# Patient Record
Sex: MALE | Race: WHITE | NOT HISPANIC OR LATINO | Employment: UNEMPLOYED | URBAN - NONMETROPOLITAN AREA
[De-identification: names, ages, dates, MRNs, and addresses within clinical notes are randomized per-mention and may not be internally consistent; named-entity substitution may affect disease eponyms.]

---

## 2017-11-09 ENCOUNTER — HOSPITAL ENCOUNTER (EMERGENCY)
Facility: HOSPITAL | Age: 42
Discharge: HOME | End: 2017-11-09
Attending: EMERGENCY MEDICINE | Admitting: NURSE PRACTITIONER

## 2017-11-09 VITALS
TEMPERATURE: 98 F | HEART RATE: 105 BPM | OXYGEN SATURATION: 99 % | SYSTOLIC BLOOD PRESSURE: 134 MMHG | RESPIRATION RATE: 18 BRPM | DIASTOLIC BLOOD PRESSURE: 79 MMHG | HEIGHT: 69 IN | WEIGHT: 130 LBS | BODY MASS INDEX: 19.26 KG/M2

## 2017-11-09 DIAGNOSIS — R11.0 NAUSEA: Primary | ICD-10-CM

## 2017-11-09 PROCEDURE — 99283 EMERGENCY DEPT VISIT LOW MDM: CPT

## 2017-11-09 RX ORDER — PROMETHAZINE HYDROCHLORIDE 25 MG/1
25 TABLET ORAL EVERY 6 HOURS PRN
Qty: 12 TABLET | Refills: 0 | Status: SHIPPED | OUTPATIENT
Start: 2017-11-09

## 2017-11-10 NOTE — ED PROVIDER NOTES
Subjective   Patient is a 42 y.o. male presenting with nausea.   History provided by:  Patient  Nausea   The primary symptoms include nausea and vomiting. Primary symptoms do not include fever, abdominal pain, diarrhea or dysuria. The illness began today. The onset was gradual. The problem has been gradually improving.   The illness does not include chills, anorexia, dysphagia, odynophagia, bloating, constipation, tenesmus, back pain or itching.       Review of Systems   Constitutional: Negative.  Negative for chills and fever.   HENT: Negative.    Respiratory: Negative.    Cardiovascular: Negative.  Negative for chest pain.   Gastrointestinal: Positive for nausea and vomiting. Negative for abdominal pain, anorexia, bloating, constipation, diarrhea and dysphagia.   Endocrine: Negative.    Genitourinary: Negative.  Negative for dysuria.   Musculoskeletal: Negative for back pain.   Skin: Negative.  Negative for itching.   Neurological: Negative.    Psychiatric/Behavioral: Negative.    All other systems reviewed and are negative.      History reviewed. No pertinent past medical history.    No Known Allergies    History reviewed. No pertinent surgical history.    History reviewed. No pertinent family history.    Social History     Social History   • Marital status:      Spouse name: N/A   • Number of children: N/A   • Years of education: N/A     Social History Main Topics   • Smoking status: Current Every Day Smoker     Packs/day: 1.00     Types: Cigarettes   • Smokeless tobacco: None   • Alcohol use 7.2 oz/week     12 Cans of beer per week   • Drug use: No   • Sexual activity: Not Asked     Other Topics Concern   • None     Social History Narrative   • None           Objective   Physical Exam   Constitutional: He is oriented to person, place, and time. He appears well-developed and well-nourished. No distress.   HENT:   Head: Normocephalic and atraumatic.   Right Ear: External ear normal.   Left Ear: External  ear normal.   Nose: Nose normal.   Eyes: Conjunctivae and EOM are normal. Pupils are equal, round, and reactive to light.   Neck: Normal range of motion. Neck supple. No JVD present. No tracheal deviation present.   Cardiovascular: Normal rate, regular rhythm and normal heart sounds.    No murmur heard.  Pulmonary/Chest: Effort normal and breath sounds normal. No respiratory distress. He has no wheezes.   Abdominal: Soft. Bowel sounds are normal. There is no tenderness.   Musculoskeletal: Normal range of motion. He exhibits no edema or deformity.   Neurological: He is alert and oriented to person, place, and time. No cranial nerve deficit.   Skin: Skin is warm and dry. No rash noted. He is not diaphoretic. No erythema. No pallor.   Psychiatric: He has a normal mood and affect. His behavior is normal. Thought content normal.   Nursing note and vitals reviewed.      Procedures         ED Course  ED Course                  MDM  Number of Diagnoses or Management Options  Nausea: minor  Risk of Complications, Morbidity, and/or Mortality  Presenting problems: minimal  Diagnostic procedures: minimal  Management options: minimal    Patient Progress  Patient progress: stable      Final diagnoses:   Nausea            Lucy Amato, APRN  11/09/17 0435

## 2020-03-16 ENCOUNTER — APPOINTMENT (OUTPATIENT)
Dept: GENERAL RADIOLOGY | Facility: HOSPITAL | Age: 45
End: 2020-03-16

## 2020-03-16 ENCOUNTER — HOSPITAL ENCOUNTER (EMERGENCY)
Facility: HOSPITAL | Age: 45
Discharge: ANOTHER HOSPITAL | End: 2020-03-16
Attending: EMERGENCY MEDICINE | Admitting: EMERGENCY MEDICINE

## 2020-03-16 VITALS
SYSTOLIC BLOOD PRESSURE: 86 MMHG | HEIGHT: 69 IN | HEART RATE: 121 BPM | DIASTOLIC BLOOD PRESSURE: 72 MMHG | WEIGHT: 140 LBS | BODY MASS INDEX: 20.73 KG/M2 | OXYGEN SATURATION: 100 % | TEMPERATURE: 97.8 F | RESPIRATION RATE: 26 BRPM

## 2020-03-16 DIAGNOSIS — I50.9 ACUTE ON CHRONIC CONGESTIVE HEART FAILURE, UNSPECIFIED HEART FAILURE TYPE (HCC): ICD-10-CM

## 2020-03-16 DIAGNOSIS — J44.9 END STAGE COPD (HCC): Primary | ICD-10-CM

## 2020-03-16 LAB
A-A DO2: 137.9 MMHG (ref 0–300)
ALBUMIN SERPL-MCNC: 3.4 G/DL (ref 3.5–5.2)
ALBUMIN/GLOB SERPL: 1 G/DL
ALP SERPL-CCNC: 144 U/L (ref 39–117)
ALT SERPL W P-5'-P-CCNC: 69 U/L (ref 1–41)
ANION GAP SERPL CALCULATED.3IONS-SCNC: 23.5 MMOL/L (ref 5–15)
ARTERIAL PATENCY WRIST A: ABNORMAL
AST SERPL-CCNC: 157 U/L (ref 1–40)
ATMOSPHERIC PRESS: 736 MMHG
BASE EXCESS BLDA CALC-SCNC: -4.7 MMOL/L (ref 0–2)
BASOPHILS # BLD AUTO: 0.01 10*3/MM3 (ref 0–0.2)
BASOPHILS NFR BLD AUTO: 0.1 % (ref 0–1.5)
BDY SITE: ABNORMAL
BILIRUB SERPL-MCNC: 3.6 MG/DL (ref 0.2–1.2)
BODY TEMPERATURE: 0 C
BUN BLD-MCNC: 68 MG/DL (ref 6–20)
BUN/CREAT SERPL: 29.3 (ref 7–25)
CALCIUM SPEC-SCNC: 9.3 MG/DL (ref 8.6–10.5)
CHLORIDE SERPL-SCNC: 79 MMOL/L (ref 98–107)
CO2 BLDA-SCNC: 19.9 MMOL/L (ref 22–33)
CO2 SERPL-SCNC: 20.5 MMOL/L (ref 22–29)
COHGB MFR BLD: 2.4 % (ref 0–5)
CREAT BLD-MCNC: 2.32 MG/DL (ref 0.76–1.27)
CRP SERPL-MCNC: 5.25 MG/DL (ref 0–0.5)
D-LACTATE SERPL-SCNC: 7.6 MMOL/L (ref 0.5–2)
DEPRECATED RDW RBC AUTO: 54.8 FL (ref 37–54)
EOSINOPHIL # BLD AUTO: 0.01 10*3/MM3 (ref 0–0.4)
EOSINOPHIL NFR BLD AUTO: 0.1 % (ref 0.3–6.2)
ERYTHROCYTE [DISTWIDTH] IN BLOOD BY AUTOMATED COUNT: 19.9 % (ref 12.3–15.4)
GAS FLOW AIRWAY: 3 LPM
GFR SERPL CREATININE-BSD FRML MDRD: 30 ML/MIN/1.73
GLOBULIN UR ELPH-MCNC: 3.3 GM/DL
GLUCOSE BLD-MCNC: 99 MG/DL (ref 65–99)
HCO3 BLDA-SCNC: 19 MMOL/L (ref 20–26)
HCT VFR BLD AUTO: 38.1 % (ref 37.5–51)
HCT VFR BLD CALC: 38.8 % (ref 38–51)
HGB BLD-MCNC: 12.5 G/DL (ref 13–17.7)
HGB BLDA-MCNC: 12.7 G/DL (ref 14–18)
HOLD SPECIMEN: NORMAL
HOLD SPECIMEN: NORMAL
HOROWITZ INDEX BLD+IHG-RTO: 32 %
IMM GRANULOCYTES # BLD AUTO: 0.18 10*3/MM3 (ref 0–0.05)
IMM GRANULOCYTES NFR BLD AUTO: 1.1 % (ref 0–0.5)
LACTATE HOLD SPECIMEN: NORMAL
LYMPHOCYTES # BLD AUTO: 1.5 10*3/MM3 (ref 0.7–3.1)
LYMPHOCYTES NFR BLD AUTO: 9.5 % (ref 19.6–45.3)
Lab: ABNORMAL
Lab: ABNORMAL
MAGNESIUM SERPL-MCNC: 2.1 MG/DL (ref 1.6–2.6)
MCH RBC QN AUTO: 26.5 PG (ref 26.6–33)
MCHC RBC AUTO-ENTMCNC: 32.8 G/DL (ref 31.5–35.7)
MCV RBC AUTO: 80.7 FL (ref 79–97)
METHGB BLD QL: 0.4 % (ref 0–3)
MODALITY: ABNORMAL
MONOCYTES # BLD AUTO: 0.86 10*3/MM3 (ref 0.1–0.9)
MONOCYTES NFR BLD AUTO: 5.4 % (ref 5–12)
NEUTROPHILS # BLD AUTO: 13.27 10*3/MM3 (ref 1.7–7)
NEUTROPHILS NFR BLD AUTO: 83.8 % (ref 42.7–76)
NOTE: ABNORMAL
NOTIFIED BY: ABNORMAL
NOTIFIED WHO: ABNORMAL
NRBC BLD AUTO-RTO: 2 /100 WBC (ref 0–0.2)
NT-PROBNP SERPL-MCNC: ABNORMAL PG/ML (ref 5–450)
OXYHGB MFR BLDV: 76.9 % (ref 94–99)
PCO2 BLDA: 30.5 MM HG (ref 35–45)
PCO2 TEMP ADJ BLD: ABNORMAL MM[HG]
PH BLDA: 7.4 PH UNITS (ref 7.35–7.45)
PH, TEMP CORRECTED: ABNORMAL
PLATELET # BLD AUTO: 258 10*3/MM3 (ref 140–450)
PMV BLD AUTO: 10 FL (ref 6–12)
PO2 BLDA: 48.6 MM HG (ref 83–108)
PO2 TEMP ADJ BLD: ABNORMAL MM[HG]
POTASSIUM BLD-SCNC: 5.6 MMOL/L (ref 3.5–5.2)
PROT SERPL-MCNC: 6.7 G/DL (ref 6–8.5)
RBC # BLD AUTO: 4.72 10*6/MM3 (ref 4.14–5.8)
SAO2 % BLDCOA: 79.1 % (ref 94–99)
SODIUM BLD-SCNC: 123 MMOL/L (ref 136–145)
TROPONIN T SERPL-MCNC: 0.13 NG/ML (ref 0–0.03)
TSH SERPL DL<=0.05 MIU/L-ACNC: 4.78 UIU/ML (ref 0.27–4.2)
VENTILATOR MODE: ABNORMAL
WBC NRBC COR # BLD: 15.83 10*3/MM3 (ref 3.4–10.8)
WHOLE BLOOD HOLD SPECIMEN: NORMAL
WHOLE BLOOD HOLD SPECIMEN: NORMAL

## 2020-03-16 PROCEDURE — 94799 UNLISTED PULMONARY SVC/PX: CPT

## 2020-03-16 PROCEDURE — 71045 X-RAY EXAM CHEST 1 VIEW: CPT

## 2020-03-16 PROCEDURE — 86140 C-REACTIVE PROTEIN: CPT | Performed by: EMERGENCY MEDICINE

## 2020-03-16 PROCEDURE — 99285 EMERGENCY DEPT VISIT HI MDM: CPT

## 2020-03-16 PROCEDURE — 83050 HGB METHEMOGLOBIN QUAN: CPT

## 2020-03-16 PROCEDURE — 80050 GENERAL HEALTH PANEL: CPT | Performed by: EMERGENCY MEDICINE

## 2020-03-16 PROCEDURE — 84484 ASSAY OF TROPONIN QUANT: CPT | Performed by: EMERGENCY MEDICINE

## 2020-03-16 PROCEDURE — 93005 ELECTROCARDIOGRAM TRACING: CPT | Performed by: EMERGENCY MEDICINE

## 2020-03-16 PROCEDURE — 83605 ASSAY OF LACTIC ACID: CPT | Performed by: EMERGENCY MEDICINE

## 2020-03-16 PROCEDURE — 93010 ELECTROCARDIOGRAM REPORT: CPT | Performed by: INTERNAL MEDICINE

## 2020-03-16 PROCEDURE — 83880 ASSAY OF NATRIURETIC PEPTIDE: CPT | Performed by: EMERGENCY MEDICINE

## 2020-03-16 PROCEDURE — 83735 ASSAY OF MAGNESIUM: CPT | Performed by: EMERGENCY MEDICINE

## 2020-03-16 PROCEDURE — 82805 BLOOD GASES W/O2 SATURATION: CPT

## 2020-03-16 PROCEDURE — 71045 X-RAY EXAM CHEST 1 VIEW: CPT | Performed by: RADIOLOGY

## 2020-03-16 PROCEDURE — 82375 ASSAY CARBOXYHB QUANT: CPT

## 2020-03-16 PROCEDURE — 36600 WITHDRAWAL OF ARTERIAL BLOOD: CPT

## 2020-03-16 PROCEDURE — 94640 AIRWAY INHALATION TREATMENT: CPT

## 2020-03-16 PROCEDURE — 87040 BLOOD CULTURE FOR BACTERIA: CPT | Performed by: EMERGENCY MEDICINE

## 2020-03-16 RX ORDER — PANTOPRAZOLE SODIUM 40 MG/1
40 TABLET, DELAYED RELEASE ORAL DAILY
COMMUNITY

## 2020-03-16 RX ORDER — OXYCODONE HYDROCHLORIDE 5 MG/1
10 TABLET ORAL ONCE
Status: COMPLETED | OUTPATIENT
Start: 2020-03-16 | End: 2020-03-16

## 2020-03-16 RX ORDER — POTASSIUM CHLORIDE 750 MG/1
20 TABLET, FILM COATED, EXTENDED RELEASE ORAL DAILY
COMMUNITY

## 2020-03-16 RX ORDER — SODIUM CHLORIDE 0.9 % (FLUSH) 0.9 %
10 SYRINGE (ML) INJECTION AS NEEDED
Status: DISCONTINUED | OUTPATIENT
Start: 2020-03-16 | End: 2020-03-16 | Stop reason: HOSPADM

## 2020-03-16 RX ORDER — LACTULOSE 10 G/15ML
15 SOLUTION ORAL 2 TIMES DAILY
COMMUNITY

## 2020-03-16 RX ORDER — IPRATROPIUM BROMIDE AND ALBUTEROL SULFATE 2.5; .5 MG/3ML; MG/3ML
3 SOLUTION RESPIRATORY (INHALATION)
Status: COMPLETED | OUTPATIENT
Start: 2020-03-16 | End: 2020-03-16

## 2020-03-16 RX ORDER — ONDANSETRON HYDROCHLORIDE 8 MG/1
TABLET, FILM COATED ORAL EVERY 8 HOURS PRN
COMMUNITY

## 2020-03-16 RX ORDER — FENTANYL 50 UG/H
1 PATCH TRANSDERMAL
COMMUNITY

## 2020-03-16 RX ORDER — SIMETHICONE 80 MG
80 TABLET,CHEWABLE ORAL EVERY 6 HOURS PRN
COMMUNITY

## 2020-03-16 RX ORDER — BUMETANIDE 2 MG/1
2 TABLET ORAL DAILY
COMMUNITY

## 2020-03-16 RX ORDER — OXYCODONE HYDROCHLORIDE 5 MG/1
5 TABLET ORAL ONCE
Status: COMPLETED | OUTPATIENT
Start: 2020-03-16 | End: 2020-03-16

## 2020-03-16 RX ORDER — IPRATROPIUM BROMIDE AND ALBUTEROL SULFATE 2.5; .5 MG/3ML; MG/3ML
3 SOLUTION RESPIRATORY (INHALATION) EVERY 4 HOURS PRN
COMMUNITY

## 2020-03-16 RX ORDER — SODIUM CHLORIDE 9 MG/ML
125 INJECTION, SOLUTION INTRAVENOUS CONTINUOUS
Status: DISCONTINUED | OUTPATIENT
Start: 2020-03-16 | End: 2020-03-16

## 2020-03-16 RX ADMIN — IPRATROPIUM BROMIDE AND ALBUTEROL SULFATE 3 ML: .5; 3 SOLUTION RESPIRATORY (INHALATION) at 10:30

## 2020-03-16 RX ADMIN — IPRATROPIUM BROMIDE AND ALBUTEROL SULFATE 3 ML: .5; 3 SOLUTION RESPIRATORY (INHALATION) at 09:58

## 2020-03-16 RX ADMIN — OXYCODONE HYDROCHLORIDE 10 MG: 5 TABLET ORAL at 15:00

## 2020-03-16 RX ADMIN — OXYCODONE HYDROCHLORIDE 5 MG: 5 TABLET ORAL at 11:15

## 2020-03-16 NOTE — ED NOTES
[facility] to [facility] transfer.  Already paged it out.     Anne Burrell  03/16/20 [id number]

## 2020-03-16 NOTE — ED NOTES
Called [facility] to transport pt to [facility] in [facility].  [facility] will call back.      Anne Burrell  03/16/20 5621

## 2020-03-16 NOTE — ED PROVIDER NOTES
"Subjective   Patient is a 44-year-old male resents complaining of a one-month history of worsening shortness of breath, particularly worse for the past 2 days.  He reports that he has a history of \"end-stage\" COPD, also congestive heart failure and cirrhosis.  He reports that he is a hospice patient.  He reports that he has been having some substernal chest pain all day today, and intermittently yesterday.  He denies fever, chills, hemoptysis, abdominal pain, vomiting, other symptoms or other complaints.  We have only a single ER visit from 2017 on file for the patient.  He reports that he generally gets his care at [facility].          Review of Systems   Constitutional: Negative for chills, diaphoresis and fever.   HENT: Negative for ear pain, sore throat and trouble swallowing.    Eyes: Negative for photophobia and pain.   Respiratory: Positive for cough, shortness of breath and wheezing.    Cardiovascular: Positive for chest pain. Negative for palpitations.   Gastrointestinal: Negative for abdominal distention, abdominal pain, blood in stool, diarrhea, nausea and vomiting.   Endocrine: Negative for polydipsia and polyphagia.   Genitourinary: Negative for difficulty urinating and flank pain.   Musculoskeletal: Negative for neck pain and neck stiffness.   Skin: Negative for color change and pallor.   Neurological: Negative for seizures, syncope and speech difficulty.   Psychiatric/Behavioral: Negative for confusion.   All other systems reviewed and are negative.      Past Medical History:   Diagnosis Date   • CHF (congestive heart failure) (CMS/HCC)    • Cirrhosis (CMS/HCC)    • COPD (chronic obstructive pulmonary disease) (CMS/HCC)    • Drug abuse (CMS/HCC)    • Esophageal varices (CMS/HCC)        No Known Allergies    History reviewed. No pertinent surgical history.    History reviewed. No pertinent family history.    Social History     Socioeconomic History   • Marital status:      Spouse name: " Not on file   • Number of children: Not on file   • Years of education: Not on file   • Highest education level: Not on file   Tobacco Use   • Smoking status: Current Every Day Smoker     Packs/day: 1.00     Types: Cigarettes   Substance and Sexual Activity   • Alcohol use: Yes     Alcohol/week: 12.0 standard drinks     Types: 12 Cans of beer per week   • Drug use: Yes     Types: IV           Objective   Physical Exam   Constitutional: He is oriented to person, place, and time. He appears ill.   Chronically ill-appearing male who appears dyspneic, though he is able to converse using complete sentences.   HENT:   Head: Normocephalic and atraumatic.   Eyes: Pupils are equal, round, and reactive to light. EOM are normal. No scleral icterus.   Neck: Normal range of motion. Neck supple. No neck rigidity. No tracheal deviation present.   Cardiovascular: Normal rate, regular rhythm and intact distal pulses.   Pulmonary/Chest: He is in respiratory distress. He has wheezes (Occasional, mild, scattered). He has rales (Bibasilar). He exhibits no tenderness.   Abdominal: Soft. Bowel sounds are normal. There is no tenderness. There is no rebound and no guarding.   Musculoskeletal: Normal range of motion. He exhibits no tenderness.        Right lower leg: He exhibits edema.        Left lower leg: He exhibits edema.   Neurological: He is alert and oriented to person, place, and time. He has normal strength. GCS eye subscore is 4. GCS verbal subscore is 5. GCS motor subscore is 6.   Skin: Skin is warm and dry. Capillary refill takes less than 2 seconds. No cyanosis. No pallor.   Psychiatric: He has a normal mood and affect. His behavior is normal.   Nursing note and vitals reviewed.      Procedures  ECG 12 Lead   Final Result   Test Reason : soa   Blood Pressure : **/** mmHG   Vent. Rate : 122 BPM     Atrial Rate : 122 BPM      P-R Int : 166 ms          QRS Dur : 118 ms       QT Int : 320 ms       P-R-T Axes : 044 -63 070 degrees       QTc Int : 456 ms      Sinus tachycardia with occasional premature ventricular complexes   Left axis deviation   Low voltage QRS   Incomplete right bundle branch block   Cannot rule out Anterior infarct , age undetermined   Non specific ST/T changes.   Abnormal ECG   No previous ECGs available   Confirmed by Trace Talavera ([id number]) on 3/16/2020 2:50:46 PM      Referred By:  [facility]           Confirmed By:Trace Talavera        XR Chest 1 View   Final Result   1. CHF/edema.   2. Small pleural effusions.   3. Bibasilar airspace disease.       This report was finalized on 3/16/2020 11:04 AM by Dr. Ovi Shelton MD.            Results for orders placed or performed during the hospital encounter of 03/16/20   Comprehensive Metabolic Panel   Result Value Ref Range    Glucose 99 65 - 99 mg/dL    BUN 68 (H) 6 - 20 mg/dL    Creatinine 2.32 (H) 0.76 - 1.27 mg/dL    Sodium 123 (L) 136 - 145 mmol/L    Potassium 5.6 (H) 3.5 - 5.2 mmol/L    Chloride 79 (L) 98 - 107 mmol/L    CO2 20.5 (L) 22.0 - 29.0 mmol/L    Calcium 9.3 8.6 - 10.5 mg/dL    Total Protein 6.7 6.0 - 8.5 g/dL    Albumin 3.40 (L) 3.50 - 5.20 g/dL    ALT (SGPT) 69 (H) 1 - 41 U/L    AST (SGOT) 157 (H) 1 - 40 U/L    Alkaline Phosphatase 144 (H) 39 - 117 U/L    Total Bilirubin 3.6 (H) 0.2 - 1.2 mg/dL    eGFR Non African Amer 30 (L) >60 mL/min/1.73    Globulin 3.3 gm/dL    A/G Ratio 1.0 g/dL    BUN/Creatinine Ratio 29.3 (H) 7.0 - 25.0    Anion Gap 23.5 (H) 5.0 - 15.0 mmol/L   BNP   Result Value Ref Range    proBNP 25,955.0 (H) 5.0 - 450.0 pg/mL   Troponin   Result Value Ref Range    Troponin T 0.134 (C) 0.000 - 0.030 ng/mL   Lactic Acid, Plasma   Result Value Ref Range    Lactate 7.6 (C) 0.5 - 2.0 mmol/L   C-reactive Protein   Result Value Ref Range    C-Reactive Protein 5.25 (H) 0.00 - 0.50 mg/dL   TSH   Result Value Ref Range    TSH 4.780 (H) 0.270 - 4.200 uIU/mL   Magnesium   Result Value Ref Range    Magnesium 2.1 1.6 - 2.6 mg/dL   CBC Auto Differential   Result Value  Ref Range    WBC 15.83 (H) 3.40 - 10.80 10*3/mm3    RBC 4.72 4.14 - 5.80 10*6/mm3    Hemoglobin 12.5 (L) 13.0 - 17.7 g/dL    Hematocrit 38.1 37.5 - 51.0 %    MCV 80.7 79.0 - 97.0 fL    MCH 26.5 (L) 26.6 - 33.0 pg    MCHC 32.8 31.5 - 35.7 g/dL    RDW 19.9 (H) 12.3 - 15.4 %    RDW-SD 54.8 (H) 37.0 - 54.0 fl    MPV 10.0 6.0 - 12.0 fL    Platelets 258 140 - 450 10*3/mm3    Neutrophil % 83.8 (H) 42.7 - 76.0 %    Lymphocyte % 9.5 (L) 19.6 - 45.3 %    Monocyte % 5.4 5.0 - 12.0 %    Eosinophil % 0.1 (L) 0.3 - 6.2 %    Basophil % 0.1 0.0 - 1.5 %    Immature Grans % 1.1 (H) 0.0 - 0.5 %    Neutrophils, Absolute 13.27 (H) 1.70 - 7.00 10*3/mm3    Lymphocytes, Absolute 1.50 0.70 - 3.10 10*3/mm3    Monocytes, Absolute 0.86 0.10 - 0.90 10*3/mm3    Eosinophils, Absolute 0.01 0.00 - 0.40 10*3/mm3    Basophils, Absolute 0.01 0.00 - 0.20 10*3/mm3    Immature Grans, Absolute 0.18 (H) 0.00 - 0.05 10*3/mm3    nRBC 2.0 (H) 0.0 - 0.2 /100 WBC   Blood Gas, Arterial With Co-Ox   Result Value Ref Range    Site Left Brachial     Tony's Test N/A     pH, Arterial 7.403 7.350 - 7.450 pH units    pCO2, Arterial 30.5 (L) 35.0 - 45.0 mm Hg    pO2, Arterial 48.6 (C) 83.0 - 108.0 mm Hg    HCO3, Arterial 19.0 (L) 20.0 - 26.0 mmol/L    Base Excess, Arterial -4.7 (L) 0.0 - 2.0 mmol/L    O2 Saturation, Arterial 79.1 (L) 94.0 - 99.0 %    Hemoglobin, Blood Gas 12.7 (L) 14 - 18 g/dL    Hematocrit, Blood Gas 38.8 38.0 - 51.0 %    Oxyhemoglobin 76.9 (L) 94 - 99 %    Methemoglobin 0.40 0.00 - 3.00 %    Carboxyhemoglobin 2.4 0 - 5 %    A-a Gradiant 137.9 0.0 - 300.0 mmHg    CO2 Content 19.9 (L) 22 - 33 mmol/L    Temperature 0.0 C    Barometric Pressure for Blood Gas 736 mmHg    Modality Nasal Cannula     FIO2 32 %    Flow Rate 3.0 lpm    Ventilator Mode NA     Note      Notified Who DR WALL AND JERARDO RN, ER     Notified By [id number]     Notified Time 03/16/2020 09:59     Collected by [id number]     pH, Temp Corrected      pCO2, Temperature Corrected      pO2,  Temperature Corrected     Lactic Acid, Reflex Timer (This will reflex a repeat order 3-3:15 hours after ordered.)   Result Value Ref Range    Hold Tube Hold for add-ons.    Light Blue Top   Result Value Ref Range    Extra Tube hold for add-on    Green Top (Gel)   Result Value Ref Range    Extra Tube Hold for add-ons.    Lavender Top   Result Value Ref Range    Extra Tube hold for add-on    Gold Top - SST   Result Value Ref Range    Extra Tube Hold for add-ons.                   ED Course  ED Course as of Mar 16 1759   Mon Mar 16, 2020   1150 Hospitalist paged.  We contacted [facility], they state they have no records for this patient more recent than 2011.    [CM]   1350 Dr. Hurtado came to see the patient, and the patient advised him that he has a DO NOT RESUSCITATE status.  He told Dr. Hurtado that he does not want treatment, all he wants is pain medicine and to be kept comfortable.  Dr. Hurtado spoke with the patient's hospice nurse, Sandi, and she has made arrangements for the patient to be admitted to the [facility] in [place].  Dr. Hoa Flanagan is the accepting physician there.    [CM]      ED Course User Index  [CM] Jose Banks MD                                           [facility]    Final diagnoses:   End stage COPD (CMS/[facility])   Acute on chronic congestive heart failure, unspecified heart failure type (CMS/[facility])             Please note that portions of this note were completed with a voice recognition program.        Jose Banks MD  03/16/20 7645

## 2020-03-16 NOTE — ED NOTES
IV access now not working. Unable to flush or obtain blood return. Dr. Banks notified. Darlene Chaparro RN notified. Will attempt US guided IV and will consult Pat WALL for midline/PICC     Shonna Vital RN  03/16/20 5536

## 2020-03-21 LAB
BACTERIA SPEC AEROBE CULT: NORMAL
BACTERIA SPEC AEROBE CULT: NORMAL